# Patient Record
(demographics unavailable — no encounter records)

---

## 2025-07-02 NOTE — PHYSICAL EXAM
[Normal Breath Sounds] : Normal breath sounds [Normal Heart Sounds] : normal heart sounds [Alert] : alert [Oriented to Person] : oriented to person [Oriented to Place] : oriented to place [Oriented to Time] : oriented to time [Calm] : calm [de-identified] : WNL [de-identified] : WNL  [de-identified] : JAMILAHL [de-identified] : WNL ROM [de-identified] : WNL [FreeTextEntry1] : Perianal inspection unremarkable.  Large hemorrhoids noted on both digital exam and anoscopy.  The left lateral was the largest.  Anoscopy performed to evaluate internal hemorrhoids.  No sedation required.

## 2025-07-02 NOTE — PHYSICAL EXAM
[Normal Breath Sounds] : Normal breath sounds [Normal Heart Sounds] : normal heart sounds [Alert] : alert [Oriented to Person] : oriented to person [Oriented to Place] : oriented to place [Oriented to Time] : oriented to time [Calm] : calm [de-identified] : WNL [de-identified] : WNL  [de-identified] : JAMILAHL [de-identified] : WNL ROM [de-identified] : WNL [FreeTextEntry1] : Perianal inspection unremarkable.  Large hemorrhoids noted on both digital exam and anoscopy.  The left lateral was the largest.  Anoscopy performed to evaluate internal hemorrhoids.  No sedation required.

## 2025-07-02 NOTE — ASSESSMENT
[FreeTextEntry1] : I have seen and evaluated the patient, and I have corroborated all nursing input into this note.  The patient has large grade 2 hemorrhoids.  I recommended a trial of rubber band ligations.  Indications, risks, benefits, alternatives reviewed including but not limited to bleeding, infection, and failure.  All questions answered.  The patient agreed.  Left lateral internal hemorrhoid banded.  Post band instruction sheet reviewed.  Patient will follow-up for further bands if any remaining symptoms.

## 2025-07-02 NOTE — PHYSICAL EXAM
[Normal Breath Sounds] : Normal breath sounds [Normal Heart Sounds] : normal heart sounds [Alert] : alert [Oriented to Person] : oriented to person [Oriented to Place] : oriented to place [Oriented to Time] : oriented to time [Calm] : calm [de-identified] : WNL [de-identified] : JAMILAHL [de-identified] : WNL  [de-identified] : WNL ROM [de-identified] : WNL [FreeTextEntry1] : Perianal inspection unremarkable.  Large hemorrhoids noted on both digital exam and anoscopy.  The left lateral was the largest.  Anoscopy performed to evaluate internal hemorrhoids.  No sedation required.

## 2025-07-02 NOTE — HISTORY OF PRESENT ILLNESS
[FreeTextEntry1] : Saúl is a 73 y/o male here for a consultation visit, hemorrhoids.   Colonoscopy in January by Dr. Ezra Ridley.   Today pt reports no pain. Daily BMs, formed, with straining, not taking laxatives/fiber, denies pain, sometimes bleeding on tp sometimes, itching, and does feel swollen tissue. Denies use of creams/ointments. Not taking any anticoagulants.